# Patient Record
(demographics unavailable — no encounter records)

---

## 2025-02-06 NOTE — PLAN
[FreeTextEntry1] : 67-year-old -0-1-4 status post colposcopy for ASCUS positive HPV and the biopsies were benign.  Patient returns for repeat Pap smear.  No pain bleeding or discharge.  Pelvic exam shows atrophic changes.  Pap smear repeated.  Return in 6 months for follow-up.

## 2025-02-06 NOTE — HISTORY OF PRESENT ILLNESS
[FreeTextEntry1] : 67-year-old -0-1-4 status post colposcopy for ASCUS positive HPV with negative colposcopy presents for repeat studies.  Denies pain bleeding or discharge.

## 2025-02-06 NOTE — PHYSICAL EXAM
[Chaperone Present] : A chaperone was present in the examining room during all aspects of the physical examination [[id number]] : A chaperone was present during the pelvic exam. [FreeTextEntry2] : Johnna [Vulvar Atrophy] : vulvar atrophy [Vulvitis] : vulvitis [Labia Majora] : normal [Labia Minora] : normal [Atrophy] : atrophy [Normal] : normal [Uterine Adnexae] : normal [Declined] : Patient declined rectal exam